# Patient Record
(demographics unavailable — no encounter records)

---

## 2025-07-18 NOTE — HISTORY OF PRESENT ILLNESS
[Never] : never [TextBox_4] : 73 year old patient presents for evaluation of  shortness of breath,chronic.  He has dyspnea on exertion  His son brings him in, translates  he has abnormal CXR, possible nodule, possible granuloma, suggests LTBI per son  Per [facility] DR Kavon Salazar reports CXR demonstrated small nonCa nodule RLL.  Sputum FB negative x 3   QuantiFERON TB Plus was negative  No further testing recommended  to evaluate for LTBI   No signs of active TB.  No weight loss, no cough with sputum.    He has had LE edema and was started on diuretic which he stopped  Primary doctor is  Dr Darryl Hoskins     PSH:  none       PMH:  denies           SH:   never smoker      ETOH:  none     Occupation: retired, worked as  [employer]       ALLERGY:   NKDA   environmental/seasonal allergy:  none       Review of Systems:    no sinusitis, sinus infections, nasal obstruction no dysphagia no dry mouth   no arthritis no joint aches no joint swelling     no pneumonia no wheeze no lung cancer   no CAD no MI no chest pain no murmur no CHF no HTN no edema   no peptic ulcer or gastritis no GERD no abdominal pain no liver disease   no Diabetes no thyroid disease no hyperlipidemia     no bleeding   no DVT or PE   no kidney disease   no stroke no seizure